# Patient Record
Sex: MALE | Race: WHITE | URBAN - METROPOLITAN AREA
[De-identification: names, ages, dates, MRNs, and addresses within clinical notes are randomized per-mention and may not be internally consistent; named-entity substitution may affect disease eponyms.]

---

## 2021-07-01 ENCOUNTER — OFFICE VISIT (OUTPATIENT)
Dept: INTERNAL MEDICINE CLINIC | Age: 41
End: 2021-07-01
Payer: COMMERCIAL

## 2021-07-01 VITALS
SYSTOLIC BLOOD PRESSURE: 122 MMHG | BODY MASS INDEX: 24.14 KG/M2 | OXYGEN SATURATION: 98 % | DIASTOLIC BLOOD PRESSURE: 64 MMHG | HEART RATE: 71 BPM | WEIGHT: 178.2 LBS | HEIGHT: 72 IN

## 2021-07-01 DIAGNOSIS — E55.9 VITAMIN D DEFICIENCY: ICD-10-CM

## 2021-07-01 DIAGNOSIS — Z13.220 SCREENING CHOLESTEROL LEVEL: ICD-10-CM

## 2021-07-01 DIAGNOSIS — K12.0 APHTHOUS ULCER: ICD-10-CM

## 2021-07-01 DIAGNOSIS — Z11.4 SCREENING FOR HIV (HUMAN IMMUNODEFICIENCY VIRUS): ICD-10-CM

## 2021-07-01 DIAGNOSIS — Z11.59 NEED FOR HEPATITIS C SCREENING TEST: ICD-10-CM

## 2021-07-01 DIAGNOSIS — F10.10 ALCOHOL ABUSE: Primary | ICD-10-CM

## 2021-07-01 DIAGNOSIS — R21 FACIAL RASH: ICD-10-CM

## 2021-07-01 DIAGNOSIS — Z13.1 SCREENING FOR DIABETES MELLITUS: ICD-10-CM

## 2021-07-01 PROCEDURE — 99204 OFFICE O/P NEW MOD 45 MIN: CPT | Performed by: INTERNAL MEDICINE

## 2021-07-01 RX ORDER — METRONIDAZOLE 7.5 MG/G
GEL TOPICAL
Qty: 1 TUBE | Refills: 3 | Status: SHIPPED | OUTPATIENT
Start: 2021-07-01

## 2021-07-01 SDOH — ECONOMIC STABILITY: FOOD INSECURITY: WITHIN THE PAST 12 MONTHS, THE FOOD YOU BOUGHT JUST DIDN'T LAST AND YOU DIDN'T HAVE MONEY TO GET MORE.: NEVER TRUE

## 2021-07-01 SDOH — ECONOMIC STABILITY: FOOD INSECURITY: WITHIN THE PAST 12 MONTHS, YOU WORRIED THAT YOUR FOOD WOULD RUN OUT BEFORE YOU GOT MONEY TO BUY MORE.: SOMETIMES TRUE

## 2021-07-01 ASSESSMENT — PATIENT HEALTH QUESTIONNAIRE - PHQ9
SUM OF ALL RESPONSES TO PHQ9 QUESTIONS 1 & 2: 0
2. FEELING DOWN, DEPRESSED OR HOPELESS: 0
SUM OF ALL RESPONSES TO PHQ QUESTIONS 1-9: 0
1. LITTLE INTEREST OR PLEASURE IN DOING THINGS: 0
SUM OF ALL RESPONSES TO PHQ QUESTIONS 1-9: 0
SUM OF ALL RESPONSES TO PHQ QUESTIONS 1-9: 0

## 2021-07-01 ASSESSMENT — SOCIAL DETERMINANTS OF HEALTH (SDOH): HOW HARD IS IT FOR YOU TO PAY FOR THE VERY BASICS LIKE FOOD, HOUSING, MEDICAL CARE, AND HEATING?: NOT HARD AT ALL

## 2021-07-01 NOTE — PROGRESS NOTES
[facility] Primary Care  Internal Medicine  New Patient Note  Celine Spears, DO      2021    Kortney Zuniga (:  1980) is a 38 y.o. male, here for evaluation of the following medical concerns:      SUBJECTIVE:    HPI     Is a 19-year-old male with past medical history of alcohol abuse who presents for follow-up from long stay in the hospital and to establish care. Patient states that he was in the hospital in 2021. He went in after being found down thought to have had alcohol withdrawal seizures. Unfortunately I am unable to look at these records currently, however patient tells me that he was in the hospital for some time. He states that he developed a pneumonia and required intubation and was in the ICU for at least 1 week. Patient then fully recovered and was discharged to a rehab facility. He was then discharged home. Patient has not used any alcohol since his discharge. He has overall fully recovered. He is currently taking no medications. From what he was able to show me he was discharged on 3 medications from his rehab facility it looks like these were for alcohol withdrawal and vitamin D. Patient has not been on any of these medicines for some time now. Of note patient also developed a lesion on his tongue. He recently went to a dentist who then sent him to [facility]. Patient is now getting a biopsy of this lesion tomorrow. Because of this and a rash seen on patient's face that developed since he left the rehab facility, [facility] had mentioned the possibility of lupus. Patient notes that he initially had the rash just on his cheeks. It then developed into a rash that was on his nose cheeks and forehead. He notes it then became a little more similar to acne than just redness. Patient notes that since he is now off of all alcohol he feels the best he has felt in some time. He really has no complaints today.   He is just concerned about the possibility of lupus with his rash and lesion on his tongue. He does note a remote history of high blood pressure requiring medications however since discharge from the hospital he has not needed any medicines for his blood pressure. He notes he is overall living a much healthier lifestyle. .    Review of Systems ROS negative except for those noted in the HPI above. Outpatient Medications Marked as Taking for the 21 encounter (Office Visit) with Morenita Mendezivette, DO   Medication Sig Dispense Refill    metroNIDAZOLE (METROGEL) 0.75 % gel Apply topically 2 times daily. 1 Tube 3        No Known Allergies    Past Medical History:   Diagnosis Date    Anxiety     Depression     Hypertension        No past surgical history on file. Social History     Socioeconomic History    Marital status:      Spouse name: Not on file    Number of children: Not on file    Years of education: Not on file    Highest education level: Not on file   Occupational History    Not on file   Tobacco Use    Smoking status: Former Smoker     Types: Cigarettes     Start date:      Quit date:      Years since quittin.5    Smokeless tobacco: Never Used   Substance and Sexual Activity    Alcohol use: Never    Drug use: Never    Sexual activity: Not on file   Other Topics Concern    Not on file   Social History Narrative    Not on file     Social Determinants of Health     Financial Resource Strain: Low Risk     Difficulty of Paying Living Expenses: Not hard at all   Food Insecurity: [employer] Worried About [address] [employer] in the Last Year: Sometimes true    Gerry of Food in the Last Year: Never true   Transportation Needs:     Lack of Transportation (Medical):      Lack of Transportation (Non-Medical):    Physical Activity:     Days of Exercise per Week:     Minutes of Exercise per Session:    Stress:     Feeling of Stress :    Social Connections:     Frequency of Communication with Friends and Family:  Frequency of Social Gatherings with Friends and Family:     Attends Muslim Services:     Active Member of Clubs or Organizations:     Attends Club or Organization Meetings:     Marital Status:    Intimate Partner Violence:     Fear of Current or Ex-Partner:     Emotionally Abused:     Physically Abused:     Sexually Abused:         No family history on file. OBJECTIVE:    Vitals:    07/01/21 1545   BP: 122/64   Pulse: 71   SpO2: 98%   Weight: 178 lb 3.2 oz (80.8 kg)   Height: 6' (1.829 m)     Body mass index is 24.17 kg/m². Physical Exam  Constitutional:       General: He is not in acute distress. Appearance: Normal appearance. He is not ill-appearing. HENT:      Head: Normocephalic and atraumatic. Nose: Nose normal. No congestion or rhinorrhea. Mouth/Throat:      Mouth: Mucous membranes are moist.      Pharynx: No oropharyngeal exudate or posterior oropharyngeal erythema. Eyes:      General: No scleral icterus. Extraocular Movements: Extraocular movements intact. Conjunctiva/sclera: Conjunctivae normal.   Cardiovascular:      Rate and Rhythm: Normal rate and regular rhythm. Pulses: Normal pulses. Heart sounds: No murmur heard. No friction rub. No gallop. Pulmonary:      Effort: Pulmonary effort is normal. No respiratory distress. Breath sounds: No wheezing, rhonchi or rales. Abdominal:      General: Bowel sounds are normal. There is no distension. Palpations: Abdomen is soft. Tenderness: There is no abdominal tenderness. There is no guarding or rebound. Musculoskeletal:         General: No swelling or tenderness. Normal range of motion. Cervical back: Normal range of motion. No muscular tenderness. Lymphadenopathy:      Cervical: No cervical adenopathy. Skin:     General: Skin is warm. Findings: Erythema and rash (Rash noted on the face in the area of the nose cheeks and forehead. There is some erythema. Telangiectasias are also noted within it.) present. Neurological:      General: No focal deficit present. Mental Status: He is alert and oriented to person, place, and time. Psychiatric:         Mood and Affect: Mood normal.         Behavior: Behavior normal.         ASSESSMENT/PLAN:  1. Alcohol abuse  History of alcohol abuse and not eating much. Will check vitamin levels as well as CBC. Patient does have a lesion on his tongue would like to make sure that this is not secondary to vitamin deficiencies. - VITAMIN B12 & FOLATE; Future  - CBC Auto Differential; Future  - VITAMIN B1; Future    2. Aphthous ulcer  Aphthous ulcer noted on the tongue. Patient is following with [facility]. Plan for biopsy tomorrow. Will rule out vitamin deficiencies as well as lupus with laboratory work today. - Comprehensive Metabolic Panel; Future  - MAYRA Reflex to Antibody Cascade; Future  - VITAMIN B12 & FOLATE; Future  - CBC Auto Differential; Future  - VITAMIN B1; Future  - ANTI-DNA ANTIBODY, DOUBLE-STRANDED; Future    3. Screening cholesterol level  Patient due for cholesterol screening.  - Lipid Panel; Future    4. Screening for diabetes mellitus  Patient due for diabetes screening  - Hemoglobin A1C; Future    5. Facial rash  Facial rash does look somewhat similar to acne rosacea. Patient does also have an aphthous ulcer on his tongue and [facility] mention possible lupus. Patient otherwise feels well. -We will obtain laboratory work to rule out lupus  -We will give patient metronidazole gel twice daily for possible rosacea  -If lab work is inconclusive will refer patient to rheumatology. If lupus is ruled out we will consider OT rheumatology referral if there is no improvement with metronidazole gel  - MAYRA Reflex to Antibody Cascade; Future  - ANTI-DNA ANTIBODY, DOUBLE-STRANDED; Future    6. Vitamin D deficiency  Patient had been on vitamin D upon discharge from rehab. Patient has not taken this recently.   We will check a vitamin D level  - Vitamin D 25 Hydroxy; Future    7. Screening for HIV (human immunodeficiency virus)  Patient due for one-time screen  - HIV Screen; Future    8. Need for hepatitis C screening test  Patient due for one-time screening  - Hepatitis C Antibody; Future         Return in about 4 weeks (around 7/29/2021).      The DO Shalom

## 2021-08-04 ENCOUNTER — OFFICE VISIT (OUTPATIENT)
Dept: INTERNAL MEDICINE CLINIC | Age: 41
End: 2021-08-04
Payer: COMMERCIAL

## 2021-08-04 VITALS
OXYGEN SATURATION: 98 % | BODY MASS INDEX: 23.59 KG/M2 | HEIGHT: 73 IN | HEART RATE: 67 BPM | SYSTOLIC BLOOD PRESSURE: 122 MMHG | DIASTOLIC BLOOD PRESSURE: 74 MMHG | RESPIRATION RATE: 16 BRPM | WEIGHT: 178 LBS

## 2021-08-04 DIAGNOSIS — L71.9 ACNE ROSACEA: ICD-10-CM

## 2021-08-04 DIAGNOSIS — M21.42 FLAT FEET, BILATERAL: ICD-10-CM

## 2021-08-04 DIAGNOSIS — M54.50 CHRONIC BILATERAL LOW BACK PAIN WITHOUT SCIATICA: ICD-10-CM

## 2021-08-04 DIAGNOSIS — F10.11 HISTORY OF ALCOHOL ABUSE: ICD-10-CM

## 2021-08-04 DIAGNOSIS — R21 FACIAL RASH: Primary | ICD-10-CM

## 2021-08-04 DIAGNOSIS — G89.29 CHRONIC BILATERAL LOW BACK PAIN WITHOUT SCIATICA: ICD-10-CM

## 2021-08-04 DIAGNOSIS — M21.41 FLAT FEET, BILATERAL: ICD-10-CM

## 2021-08-04 PROCEDURE — 99213 OFFICE O/P EST LOW 20 MIN: CPT | Performed by: INTERNAL MEDICINE

## 2021-08-04 RX ORDER — ACETAMINOPHEN 160 MG
2000 TABLET,DISINTEGRATING ORAL DAILY
COMMUNITY

## 2021-08-04 NOTE — PROGRESS NOTES
mild improvement however the rash is definitely still there. He states that it flares when he is hot or drinks hot things. Rashes not painful or irritating and patient states he would not notes there unless he is followed in the mirror. Patient also notes that he has some chronic low back pain and knee pain. He feels this is from his many years of playing sports. Pain in the back does not radiate down any of the legs. Patient denies any weakness in the legs. Patient states that he has dealt with knee pain for some time. He feels that it is secondary to his flat feet. He notes that he saw podiatry in the past for inserts and is interested in doing this again. Review of Systems ROS negative except for those noted in the HPI above. Vitals:    08/04/21 1032   BP: 122/74   Pulse: 67   Resp: 16   SpO2: 98%     The 10-year ASCVD risk score (June Dominique, et al., 2013) is: 1.7%    Values used to calculate the score:      Age: 38 years      Sex: Male      Is Non- : No      Diabetic: No      Tobacco smoker: No      Systolic Blood Pressure: 513 mmHg      Is BP treated: No      HDL Cholesterol: 35 mg/dL      Total Cholesterol: 189 mg/dL      Physical Exam  Constitutional:       General: He is not in acute distress. Appearance: Normal appearance. He is not ill-appearing. HENT:      Head: Normocephalic and atraumatic. Nose: Nose normal. No congestion or rhinorrhea. Mouth/Throat:      Mouth: Mucous membranes are moist.      Pharynx: No oropharyngeal exudate or posterior oropharyngeal erythema. Eyes:      General: No scleral icterus. Conjunctiva/sclera: Conjunctivae normal.   Cardiovascular:      Rate and Rhythm: Normal rate and regular rhythm. Pulses: Normal pulses. Heart sounds: No murmur heard. No friction rub. No gallop. Pulmonary:      Effort: Pulmonary effort is normal. No respiratory distress. Breath sounds: No wheezing, rhonchi or rales. Abdominal:      General: Bowel sounds are normal. There is no distension. Palpations: Abdomen is soft. Tenderness: There is no abdominal tenderness. There is no guarding or rebound. Musculoskeletal:         General: Tenderness (on the joint line of the knees bilaterally) present. Cervical back: No muscular tenderness. Skin:     General: Skin is warm. Findings: No erythema or rash. Neurological:      General: No focal deficit present. Mental Status: He is alert and oriented to person, place, and time. Psychiatric:         Mood and Affect: Mood normal.         Behavior: Behavior normal.           An electronic signature was used to authenticate this note.     --Favian Jackman DO